# Patient Record
Sex: MALE | Race: OTHER | Employment: FULL TIME | URBAN - METROPOLITAN AREA
[De-identification: names, ages, dates, MRNs, and addresses within clinical notes are randomized per-mention and may not be internally consistent; named-entity substitution may affect disease eponyms.]

---

## 2019-04-23 ENCOUNTER — OFFICE VISIT (OUTPATIENT)
Dept: FAMILY MEDICINE CLINIC | Age: 44
End: 2019-04-23

## 2019-04-23 VITALS
DIASTOLIC BLOOD PRESSURE: 87 MMHG | TEMPERATURE: 98.5 F | HEIGHT: 70 IN | OXYGEN SATURATION: 100 % | HEART RATE: 54 BPM | WEIGHT: 230.2 LBS | SYSTOLIC BLOOD PRESSURE: 134 MMHG | BODY MASS INDEX: 32.96 KG/M2

## 2019-04-23 DIAGNOSIS — Z13.9 ENCOUNTER FOR SCREENING: ICD-10-CM

## 2019-04-23 DIAGNOSIS — G62.9 NEUROPATHY: Primary | ICD-10-CM

## 2019-04-23 LAB — GLUCOSE POC: NORMAL MG/DL

## 2019-04-23 RX ORDER — GABAPENTIN 300 MG/1
300 CAPSULE ORAL 3 TIMES DAILY
Qty: 90 CAP | Refills: 3 | Status: SHIPPED | OUTPATIENT
Start: 2019-04-23 | End: 2019-05-16
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
## 2019-04-23 NOTE — PROGRESS NOTES
Results for orders placed or performed in visit on 04/23/19   AMB POC GLUCOSE BLOOD, BY GLUCOSE MONITORING DEVICE   Result Value Ref Range    Glucose POC f 72 mg/dL

## 2019-04-23 NOTE — PROGRESS NOTES
Printed AVS, provided to pt and reviewed. Pt indicated understanding and had no questions. Told pt that rx's have been sent to pharmacy and they should be ready for  in approximately 2 hrs. Please present GoodRx. com coupon which we provide to your pharmacy to receive discounted price. The medication was reviewed with the pt. Mila Hale was the .   Dori Urias RN

## 2019-05-15 ENCOUNTER — TELEPHONE (OUTPATIENT)
Dept: FAMILY MEDICINE CLINIC | Age: 44
End: 2019-05-15

## 2019-05-15 DIAGNOSIS — M79.2 NEUROPATHIC PAIN: Primary | ICD-10-CM

## 2019-05-16 RX ORDER — AMITRIPTYLINE HYDROCHLORIDE 25 MG/1
25 TABLET, FILM COATED ORAL
Qty: 30 TAB | Refills: 2 | Status: SHIPPED | OUTPATIENT
Start: 2019-05-16 | End: 2019-07-29 | Stop reason: SINTOL

## 2019-05-16 NOTE — TELEPHONE ENCOUNTER
I talked with patient and he said he had a severe rash that itched on his hands and his feet. He stopped the gabapentin, took allergy medicine, and it has cleared. I said that allergic reactions usually are more widespread than only affecting the hands and feet; however, with his rash now gone, we can try another medication. I explained that the new medication is to be taken before bed once daily and he can pick it up at his [address] [place] [address] on [address]. I asked him to call back if any problems. No further questions at close of call.

## 2019-07-16 ENCOUNTER — TELEPHONE (OUTPATIENT)
Dept: FAMILY MEDICINE CLINIC | Age: 44
End: 2019-07-16

## 2019-07-16 NOTE — TELEPHONE ENCOUNTER
Patient  has been contacted 7/16/19 to notify change of Facility and reminders has been sent by mail.     Thank you

## 2019-07-25 ENCOUNTER — HOSPITAL ENCOUNTER (OUTPATIENT)
Dept: LAB | Age: 44
Discharge: HOME | End: 2019-07-25

## 2019-07-25 ENCOUNTER — OFFICE VISIT (OUTPATIENT)
Dept: FAMILY MEDICINE CLINIC | Age: 44
End: 2019-07-25

## 2019-07-25 VITALS
TEMPERATURE: 98.3 F | SYSTOLIC BLOOD PRESSURE: 142 MMHG | WEIGHT: 232 LBS | HEART RATE: 68 BPM | DIASTOLIC BLOOD PRESSURE: 80 MMHG | BODY MASS INDEX: 33.59 KG/M2 | OXYGEN SATURATION: 100 %

## 2019-07-25 DIAGNOSIS — G58.9 MONONEUROPATHY: ICD-10-CM

## 2019-07-25 DIAGNOSIS — G62.9 NEUROPATHY: Primary | ICD-10-CM

## 2019-07-25 PROCEDURE — 80061 LIPID PANEL: CPT

## 2019-07-25 PROCEDURE — 85025 COMPLETE CBC W/AUTO DIFF WBC: CPT

## 2019-07-25 PROCEDURE — 82607 VITAMIN B-12: CPT

## 2019-07-25 PROCEDURE — 80053 COMPREHEN METABOLIC PANEL: CPT

## 2019-07-25 NOTE — PROGRESS NOTES
Coordination of Care  1. Have you been to the ER, urgent care clinic since your last visit? Hospitalized since your last visit? No    2. Have you seen or consulted any other health care providers outside of the [address] [place] [address] since your last visit? Include any pap smears or colon screening. No    Does the patient need refills? NO    Learning Assessment Complete?  yes  Depression Screening complete in the past 12 months? yes
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
## 2019-07-25 NOTE — PROGRESS NOTES
AVS printed and reviewed. No escripts ordered today , reviewed 5/16/19 order for Amitriptyline and discussed refills. F.u per provider notes. Discussion assisted by [facility] , Jaye Longoria.

## 2019-07-26 LAB
ALBUMIN SERPL-MCNC: 4.6 G/DL (ref 3.5–5)
ALBUMIN/GLOB SERPL: 1.3 {RATIO} (ref 1.1–2.2)
ALP SERPL-CCNC: 99 U/L (ref 45–117)
ALT SERPL-CCNC: 84 U/L (ref 12–78)
ANION GAP SERPL CALC-SCNC: 5 MMOL/L (ref 5–15)
AST SERPL-CCNC: 30 U/L (ref 15–37)
BASOPHILS # BLD: 0.1 K/UL (ref 0–0.1)
BASOPHILS NFR BLD: 1 % (ref 0–1)
BILIRUB SERPL-MCNC: 0.5 MG/DL (ref 0.2–1)
BUN SERPL-MCNC: 12 MG/DL (ref 6–20)
BUN/CREAT SERPL: 11 (ref 12–20)
CALCIUM SERPL-MCNC: 10.2 MG/DL (ref 8.5–10.1)
CHLORIDE SERPL-SCNC: 105 MMOL/L (ref 97–108)
CHOLEST SERPL-MCNC: 297 MG/DL
CO2 SERPL-SCNC: 31 MMOL/L (ref 21–32)
CREAT SERPL-MCNC: 1.06 MG/DL (ref 0.7–1.3)
DIFFERENTIAL METHOD BLD: ABNORMAL
EOSINOPHIL # BLD: 0.1 K/UL (ref 0–0.4)
EOSINOPHIL NFR BLD: 3 % (ref 0–7)
ERYTHROCYTE [DISTWIDTH] IN BLOOD BY AUTOMATED COUNT: 13.5 % (ref 11.5–14.5)
GLOBULIN SER CALC-MCNC: 3.6 G/DL (ref 2–4)
GLUCOSE SERPL-MCNC: 94 MG/DL (ref 65–100)
HCT VFR BLD AUTO: 51.1 % (ref 36.6–50.3)
HDLC SERPL-MCNC: 37 MG/DL
HDLC SERPL: 8 {RATIO} (ref 0–5)
HGB BLD-MCNC: 16.3 G/DL (ref 12.1–17)
IMM GRANULOCYTES # BLD AUTO: 0 K/UL (ref 0–0.04)
IMM GRANULOCYTES NFR BLD AUTO: 1 % (ref 0–0.5)
LDLC SERPL CALC-MCNC: 211.2 MG/DL (ref 0–100)
LIPID PROFILE,FLP: ABNORMAL
LYMPHOCYTES # BLD: 1.8 K/UL (ref 0.8–3.5)
LYMPHOCYTES NFR BLD: 34 % (ref 12–49)
MCH RBC QN AUTO: 28.9 PG (ref 26–34)
MCHC RBC AUTO-ENTMCNC: 31.9 G/DL (ref 30–36.5)
MCV RBC AUTO: 90.6 FL (ref 80–99)
MONOCYTES # BLD: 0.3 K/UL (ref 0–1)
MONOCYTES NFR BLD: 6 % (ref 5–13)
NEUTS SEG # BLD: 2.9 K/UL (ref 1.8–8)
NEUTS SEG NFR BLD: 56 % (ref 32–75)
NRBC # BLD: 0 K/UL (ref 0–0.01)
NRBC BLD-RTO: 0 PER 100 WBC
PLATELET # BLD AUTO: 231 K/UL (ref 150–400)
POTASSIUM SERPL-SCNC: 5 MMOL/L (ref 3.5–5.1)
PROT SERPL-MCNC: 8.2 G/DL (ref 6.4–8.2)
RBC # BLD AUTO: 5.64 M/UL (ref 4.1–5.7)
SODIUM SERPL-SCNC: 141 MMOL/L (ref 136–145)
TRIGL SERPL-MCNC: 244 MG/DL (ref ?–150)
VIT B12 SERPL-MCNC: 638 PG/ML (ref 193–986)
VLDLC SERPL CALC-MCNC: 48.8 MG/DL
WBC # BLD AUTO: 5.2 K/UL (ref 4.1–11.1)

## 2019-07-29 DIAGNOSIS — G58.9 MONONEUROPATHY: Primary | ICD-10-CM

## 2019-07-29 DIAGNOSIS — E78.00 PURE HYPERCHOLESTEROLEMIA: Primary | ICD-10-CM

## 2019-07-29 RX ORDER — SIMVASTATIN 40 MG/1
40 TABLET, FILM COATED ORAL
Qty: 90 TAB | Refills: 1 | Status: SHIPPED | OUTPATIENT
Start: 2019-07-29 | End: 2019-11-12 | Stop reason: SDUPTHER

## 2019-07-29 RX ORDER — METHYLPREDNISOLONE 4 MG/1
TABLET ORAL
Qty: 21 TAB | Refills: 0 | Status: SHIPPED | OUTPATIENT
Start: 2019-07-29 | End: 2019-11-12

## 2019-07-29 NOTE — TELEPHONE ENCOUNTER
Patient to be informed that his cholesterol is extremely high (more than double the normal amount). In addition to dietary changes, I recommend he start medication for cholesterol which I am sending to his [address]. Keep follow up appointment. Return 2 weeks before that appointment, fasting, to re-check cholesterol. ALSO, needs to get an x-ray of his lumbar spine as a walk-in. Fill the prescription for the medrol dosepak for the leg pain.

## 2019-07-29 NOTE — PROGRESS NOTES
Sending in medication for very high cholesterol to his [facility]. Prior to scheduled follow up, return 2 weeks before for fasting labs to recheck cholesterol, as ordered. Diagnoses and all orders for this visit:    1. Pure hypercholesterolemia  -     simvastatin (ZOCOR) 40 mg tablet; Take 1 Tab by mouth nightly. For cholesterol. Nancie 1 tab en la noche para colesterol.  -     LIPID PANEL; Future    .

## 2019-07-31 NOTE — TELEPHONE ENCOUNTER
RN called pt with the assistance of , Sukh Guzman, and related all information in [facility] lab result message. RN reviewed the 2 new medications with pt. RN also reviewed dietary changes for reducing cholesterol and sent pt an information sheet from Clinical resources explaining diet recommendations. RN also recommended that pt exercise at least 30 minutes daily. Pt stated that he will go to either [facility] or [facility] for his lumbar spine xray. The care card application process was explained to pt. Pt was reminded of his appt on 11/12/19 and advised to return 2 weeks prior to that date for fasting labs. Pt expressed understanding of the above information. Sandy Nettles.

## 2019-10-13 ENCOUNTER — DOCUMENTATION ONLY (OUTPATIENT)
Dept: FAMILY MEDICINE CLINIC | Age: 44
End: 2019-10-13

## 2019-10-13 NOTE — PROGRESS NOTES
Has follow up with [facility] 11/12/19. Per Wild Clifford:  Nakia Velazquez, Wild Clifford PA sent to Sadia Quezada NP             A couple of thoughts-   1. Get imaging of lumbar spine looking for spondylosis, etc   2.  Try medrol dose pack - some sxs are presenting like sciatica and this might help him

## 2019-11-09 ENCOUNTER — APPOINTMENT (OUTPATIENT)
Dept: GENERAL RADIOLOGY | Age: 44
End: 2019-11-09
Attending: STUDENT IN AN ORGANIZED HEALTH CARE EDUCATION/TRAINING PROGRAM
Payer: SUBSIDIZED

## 2019-11-09 ENCOUNTER — HOSPITAL ENCOUNTER (EMERGENCY)
Age: 44
Discharge: HOME | End: 2019-11-09
Attending: STUDENT IN AN ORGANIZED HEALTH CARE EDUCATION/TRAINING PROGRAM | Admitting: STUDENT IN AN ORGANIZED HEALTH CARE EDUCATION/TRAINING PROGRAM
Payer: SUBSIDIZED

## 2019-11-09 VITALS
TEMPERATURE: 98.1 F | RESPIRATION RATE: 16 BRPM | DIASTOLIC BLOOD PRESSURE: 89 MMHG | OXYGEN SATURATION: 98 % | SYSTOLIC BLOOD PRESSURE: 140 MMHG | HEART RATE: 61 BPM

## 2019-11-09 DIAGNOSIS — S20.211A CONTUSION OF RIB ON RIGHT SIDE, INITIAL ENCOUNTER: Primary | ICD-10-CM

## 2019-11-09 PROCEDURE — 71101 X-RAY EXAM UNILAT RIBS/CHEST: CPT

## 2019-11-09 PROCEDURE — 99283 EMERGENCY DEPT VISIT LOW MDM: CPT

## 2019-11-09 RX ORDER — LIDOCAINE 50 MG/G
PATCH TOPICAL
Qty: 5 EACH | Refills: 0 | Status: SHIPPED | OUTPATIENT
Start: 2019-11-09

## 2019-11-09 NOTE — ED PROVIDER NOTES
Rib Pain   This is a new problem. The average episode lasts 17 hours. The problem occurs continuously. The current episode started yesterday (at [facility]). The problem has not changed since onset. Associated symptoms include chest pain (R ribs). Pertinent negatives include no fever, no neck pain, no cough, no syncope, no vomiting, no abdominal pain and no rash. Precipitated by: a fall, yesterday, while tying shoes, landed on floor. He has tried nothing for the symptoms. No past medical history on file. No past surgical history on file. No family history on file.     Social History     Socioeconomic History    Marital status: SINGLE     Spouse name: Not on file    Number of children: Not on file    Years of education: Not on file    Highest education level: Not on file   Occupational History    Not on file   Social Needs    Financial resource strain: Not on file    Food insecurity:     Worry: Not on file     Inability: Not on file    Transportation needs:     Medical: Not on file     Non-medical: Not on file   Tobacco Use    Smoking status: Never Smoker    Smokeless tobacco: Never Used   Substance and Sexual Activity    Alcohol use: Yes     Comment: OCC    Drug use: Never    Sexual activity: Not on file   Lifestyle    Physical activity:     Days per week: Not on file     Minutes per session: Not on file    Stress: Not on file   Relationships    Social connections:     Talks on phone: Not on file     Gets together: Not on file     Attends Scientologist service: Not on file     Active member of club or organization: Not on file     Attends meetings of clubs or organizations: Not on file     Relationship status: Not on file    Intimate partner violence:     Fear of current or ex partner: Not on file     Emotionally abused: Not on file     Physically abused: Not on file     Forced sexual activity: Not on file   Other Topics Concern    Not on file   Social History Narrative    Not on file ALLERGIES: Neurontin [gabapentin]    Review of Systems   Constitutional: Negative for fever. Respiratory: Negative for cough and shortness of breath. Cardiovascular: Positive for chest pain (R ribs). Negative for syncope. Gastrointestinal: Negative for abdominal pain and vomiting. Musculoskeletal: Negative for neck pain. Skin: Negative for rash. Vitals:    11/09/19 0924 11/09/19 0928   BP:  140/89   Pulse: 70 61   Resp:  16   Temp:  98.1 °F (36.7 °C)   SpO2: 100% 98%            Physical Exam   Constitutional: He is oriented to person, place, and time. He appears well-developed. No distress. HENT:   Head: Normocephalic and atraumatic. Eyes: Conjunctivae and EOM are normal.   Neck: Normal range of motion. Neck supple. Cardiovascular: Normal rate, regular rhythm and normal heart sounds. Pulmonary/Chest: Effort normal and breath sounds normal. No respiratory distress. He exhibits tenderness (over R lateral chest wall, no crepitus, no overlying ecchymosis). Abdominal: Soft. There is no tenderness. There is no guarding. Musculoskeletal: Normal range of motion. He exhibits no edema. Neurological: He is alert and oriented to person, place, and time. He exhibits normal muscle tone. Skin: Skin is warm and dry. Vitals reviewed. MDM       Procedures    The patient presented with a complaint of a fall or minor trauma. The patient is now resting comfortably and feels better, is alert and in no distress. The patient has a normal mental status and is neurologically intact. The history, exam, diagnostic testing (if any) and current condition do not demonstrate signs of clinically significant intra-cranial, intra-thoracic, intra-abdominal, or musculoskeletal trauma. The vital signs have been stable. The patient's condition is stable and appropriate for discharge.  The patient will pursue further outpatient evaluation with the primary care physician or other designated or consulting physician as indicated in the discharge instructions.

## 2019-11-09 NOTE — DISCHARGE INSTRUCTIONS
Patient Education        Contusión de starr: Instrucciones de cuidado - [ Bruised Rib: Care Instructions ]  Generalidades    Usted puede magullarse blanco starr si se  o se golpea, naheed en un accidente o al practicar deportes. El término médico para blanco magulladura es \"contusión\". Pequeños vasos sanguíneos se desgarran y evsta escapar jose miguel bajo la piel. Verlon Pawel de las personas se imaginan blanco magulladura naheed blanco jarred janelle y yvette (moretón). Raya los [employer] y los músculos también pueden [place]. Blanco lesión puede dañar la starr, raya no causar un moretón visible. A veces puede ser difícil saber si blanco starr está magullada o fracturada. Los síntomas pueden ser los mismos. Y un hueso fracturado no siempre se puede teressa en blanco radiografía. Raya el tratamiento para blanco contusión de starr suele ser el mismo que para blanco fractura de [place]. Loann [place] a las costillas puede causar dolor. El dolor puede ser peor cuando respira hondo, tose o estornuda. En la mayoría de [facility], blanco starr magullada norma por sí myriam. Puede mora analgésicos (medicamentos para el dolor) hasta que la starr sane. El alivio del dolor le permite respirar hondo. La atención de seguimiento es blanco parte clave de trejo tratamiento y seguridad. Asegúrese de hacer y acudir a todas las citas, y llame a trejo médico si está teniendo problemas. También es blanco buena idea saber los resultados de jade exámenes y mantener blanco lista de los medicamentos que matt. ¿Cómo puede cuidarse en el hogar? · Descanse y proteja la jarred lesionada o dolorida. Suspenda, cambie o descanse de cualquier actividad que le cause dolor. · Aplíquese hielo o blanco compresa fría en la jarred por entre 10 y 21 minutos cada vez. Póngase un paño del rio entre el hielo y la piel. · Después de 2 o 3 días, si la hinchazón ha desaparecido, aplíquese blanco almohadilla térmica (a baja temperatura) o un paño tibio sobre el pecho.  Algunos médicos sugieren que alterne entre calor y frío. Póngase un paño del rio entre la almohadilla térmica y la piel. · Pregúntele a trejo médico si puede mora un analgésico de venta angelica, naheed acetaminofén (Tylenol), ibuprofeno (Advil, Motrin) o naproxeno (Aleve). Sea sue con los medicamentos. Suzanne y siga todas las instrucciones de la [place]. · A medida que el dolor disminuye, regrese lentamente a jade actividades normales. Sea paciente. Las contusiones de starr pueden tardar semanas o meses en sanar. Si el dolor MARY, podría ser blanco señal de que necesita descansar fariba [place]. ¿Cuándo debe pedir ayuda? Llame al 911 en cualquier momento que considere que necesita atención de [place]. Por ejemplo, llame si:    · Tiene mucha dificultad para respirar.     Llame a trejo médico ahora mismo o busque atención médica inmediata si:    · Tiene dificultad para respirar.     · Tiene fiebre.     · Tiene tos nueva o peor.     · Tiene dolor nuevo o peor.    Preste especial atención a los cambios en trejo therese y asegúrese de comunicarse con trejo médico si:    · No mejora naheed se esperaba. ¿Dónde puede encontrar más información en inglés? Minerva Biggs a http://stephanie-marcia.info/. Escriba R125 en la búsqueda para aprender más acerca de \"Contusión de starr: Instrucciones de cuidado - [ Bruised Rib: Care Instructions ]. \"  Revisado: 26 junio, 2019  Versión del contenido: 12.2  © 5633-7043 [employer]. Las instrucciones de cuidado fueron adaptadas bajo licencia por [employer] (which disclaims liability or warranty for this information). Si usted tiene [place] afección médica o sobre estas instrucciones, siempre pregunte a trejo profesional de therese. [facility] niega toda garantía o responsabilidad por trejo uso de esta información.
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
## 2019-11-12 ENCOUNTER — HOSPITAL ENCOUNTER (OUTPATIENT)
Dept: LAB | Age: 44
Discharge: HOME | End: 2019-11-12

## 2019-11-12 ENCOUNTER — OFFICE VISIT (OUTPATIENT)
Dept: FAMILY MEDICINE CLINIC | Age: 44
End: 2019-11-12

## 2019-11-12 VITALS
DIASTOLIC BLOOD PRESSURE: 80 MMHG | BODY MASS INDEX: 32.35 KG/M2 | WEIGHT: 226 LBS | OXYGEN SATURATION: 98 % | HEART RATE: 57 BPM | SYSTOLIC BLOOD PRESSURE: 131 MMHG | HEIGHT: 70 IN | TEMPERATURE: 98.2 F

## 2019-11-12 DIAGNOSIS — M72.2 PLANTAR FASCIITIS OF LEFT FOOT: Primary | ICD-10-CM

## 2019-11-12 DIAGNOSIS — Z23 ENCOUNTER FOR IMMUNIZATION: ICD-10-CM

## 2019-11-12 DIAGNOSIS — S20.211D CONTUSION OF RIB ON RIGHT SIDE, SUBSEQUENT ENCOUNTER: ICD-10-CM

## 2019-11-12 DIAGNOSIS — E78.2 MIXED HYPERLIPIDEMIA: ICD-10-CM

## 2019-11-12 LAB
ALBUMIN SERPL-MCNC: 4.3 G/DL (ref 3.5–5)
ALBUMIN/GLOB SERPL: 1.2 {RATIO} (ref 1.1–2.2)
ALP SERPL-CCNC: 95 U/L (ref 45–117)
ALT SERPL-CCNC: 69 U/L (ref 12–78)
ANION GAP SERPL CALC-SCNC: 3 MMOL/L (ref 5–15)
AST SERPL-CCNC: 33 U/L (ref 15–37)
BILIRUB SERPL-MCNC: 0.5 MG/DL (ref 0.2–1)
BUN SERPL-MCNC: 14 MG/DL (ref 6–20)
BUN/CREAT SERPL: 16 (ref 12–20)
CALCIUM SERPL-MCNC: 9.7 MG/DL (ref 8.5–10.1)
CHLORIDE SERPL-SCNC: 108 MMOL/L (ref 97–108)
CHOLEST SERPL-MCNC: 246 MG/DL
CO2 SERPL-SCNC: 30 MMOL/L (ref 21–32)
CREAT SERPL-MCNC: 0.88 MG/DL (ref 0.7–1.3)
GLOBULIN SER CALC-MCNC: 3.7 G/DL (ref 2–4)
GLUCOSE SERPL-MCNC: 88 MG/DL (ref 65–100)
HDLC SERPL-MCNC: 43 MG/DL
HDLC SERPL: 5.7 {RATIO} (ref 0–5)
LDLC SERPL CALC-MCNC: 183.2 MG/DL (ref 0–100)
LIPID PROFILE,FLP: ABNORMAL
POTASSIUM SERPL-SCNC: 5.3 MMOL/L (ref 3.5–5.1)
PROT SERPL-MCNC: 8 G/DL (ref 6.4–8.2)
SODIUM SERPL-SCNC: 141 MMOL/L (ref 136–145)
TRIGL SERPL-MCNC: 99 MG/DL (ref ?–150)
VLDLC SERPL CALC-MCNC: 19.8 MG/DL

## 2019-11-12 PROCEDURE — 80053 COMPREHEN METABOLIC PANEL: CPT

## 2019-11-12 PROCEDURE — 80061 LIPID PANEL: CPT

## 2019-11-12 RX ORDER — SIMVASTATIN 40 MG/1
40 TABLET, FILM COATED ORAL
Qty: 90 TAB | Refills: 1 | Status: SHIPPED | OUTPATIENT
Start: 2019-11-12 | End: 2019-11-14 | Stop reason: SDUPTHER

## 2019-11-12 RX ORDER — AMITRIPTYLINE HYDROCHLORIDE 10 MG/1
10 TABLET, FILM COATED ORAL
Qty: 30 TAB | Refills: 0 | Status: SHIPPED | OUTPATIENT
Start: 2019-11-12

## 2019-11-12 NOTE — PROGRESS NOTES
[facility]  Requests flu vaccine. Denies fever and egg allergy. VIS information sheet given to patient. Explained possible s/e. Reviewed s/sx indicating need to be seen in ER. Patient had no adverse reaction at time of discharge. Entered into VIIS. GIVEN BY EDUAR SHIN LPN.  Alana Stovall RN

## 2019-11-12 NOTE — PROGRESS NOTES
Check-out Note: Labs today.  I sent in simvastatin (cholesterol med) for #90 and a refill.  If labs show you need a change to this treatment we will call.  I sent in 1 month of amitriptyline 10 mg.  Take qhs.  I am sorry I don't have 1 month follow up appointments, you would need to make the line.  If this works please have him call us and as long as all labs are normal and medicine working well I will send in more. Sunil Shrestha for flu vaccine today. Printed AVS, provided to pt and reviewed. Pt indicated understanding and had no questions. Told pt that rx's have been sent to pharmacy and they should be ready for  in approximately 2 hrs. Reviewed all medication's with the pt. Pt recommended to make the line in 1 month if medication's not working well or if he needs to be seen sooner. No appts are available at this time in 1 month. Pt told if labs are ok and medications are working well he should call to the [facility] office and let the nurse know and the provider can refill the rx. Int # [id number]. Pt requesting flu vaccine. Pt referred to the [facility] in the registration area to get his flu vaccine.  Jenny Freire RN

## 2019-11-12 NOTE — PROGRESS NOTES
Coordination of Care  1. Have you been to the ER, urgent care clinic since your last visit? Hospitalized since your last visit? No    2. Have you seen or consulted any other health care providers outside of the [address] [place] [address] since your last visit? Include any pap smears or colon screening. No    Does the patient need refills no    Learning Assessment Complete?  yes  Depression Screening complete in the past 12 months? yes

## 2019-11-14 DIAGNOSIS — E78.2 MIXED HYPERLIPIDEMIA: ICD-10-CM

## 2019-11-14 DIAGNOSIS — E87.5 HYPERKALEMIA: Primary | ICD-10-CM

## 2019-11-14 RX ORDER — SIMVASTATIN 40 MG/1
80 TABLET, FILM COATED ORAL
Qty: 90 TAB | Refills: 0
Start: 2019-11-14 | End: 2019-12-29

## 2019-11-14 RX ORDER — SIMVASTATIN 80 MG/1
80 TABLET, FILM COATED ORAL
Qty: 90 TAB | Refills: 1 | Status: SHIPPED | OUTPATIENT
Start: 2019-12-30

## 2019-11-14 RX ORDER — SIMVASTATIN 80 MG/1
80 TABLET, FILM COATED ORAL
Qty: 90 TAB | Refills: 1 | Status: SHIPPED | OUTPATIENT
Start: 2019-12-30 | End: 2019-11-14 | Stop reason: SDUPTHER

## 2019-11-14 NOTE — PROGRESS NOTES
Patient's cholesterol is still significantly high on simvastatin 40 mg po qhs.  - increase simvastatin 40 mg, 1 po qhs to 2 po qhs  - when these run out, purchase simvastatin 80 mg, 1 po qhs  - check fasting lipid panel around 2/14/20. Patient's potassium is elevated. - Avoid high potassium foods and salt substitutes. - Recheck CMP (nonfasting) around 12/27/19. Diagnoses and all orders for this visit:    1. Mixed hyperlipidemia  -     simvastatin (ZOCOR) 40 mg tablet; Take 2 Tabs by mouth nightly for 45 days. For cholesterol. Nancie 2 tabletas en la noche para colesterol.  -     simvastatin (ZOCOR) 80 mg tablet; Take 1 Tab by mouth nightly. For cholesterol. Nancie 1 tab antes de dormir para colesterol.  -     LIPID PANEL; Future (2/14/20, about 3 months)    2. Hyperkalemia  -     METABOLIC PANEL, COMPREHENSIVE; Future (12/27/19, about 6 weeks)          Current Outpatient Medications   Medication Sig    simvastatin (ZOCOR) 40 mg tablet Take 2 Tabs by mouth nightly for 45 days until 12/29/19. For cholesterol.  [START ON 12/30/2019] simvastatin (ZOCOR) 80 mg tablet Take 1 Tab by mouth nightly. For cholesterol.

## 2019-11-15 ENCOUNTER — TELEPHONE (OUTPATIENT)
Dept: FAMILY MEDICINE CLINIC | Age: 44
End: 2019-11-15

## 2019-11-15 NOTE — TELEPHONE ENCOUNTER
The pt was called with the assistance of Kasandra Pinon as . The pt was given the entire provider's lab results and recommendation's message. The pt was told about the Cholesterol medication Simvastatin 40 mg to increase it to 2 tablets daily. He was instructed he would run out on 12/29/19. New rx was sent to the Pharmacy to start 12/30/19, for this medication, but each tablet in the new rx would be 80 mg so he would only take 1 pill daily of the new Simvastatin rx. The was told his K+ was slightly elevated and he was told to avoid high K+ foods and supplements that contain K+. The pt was given examples of high K+ foods. Pt asked how high was his Cholesterol. He was told 246. The pt was told normal would be <200. The pt was given a lab only appt for 02/10/2020. The pt verbalized understanding.  Jeremiah Hanna RN

## 2019-11-15 NOTE — TELEPHONE ENCOUNTER
----- Message from Vinny Junior NP sent at 11/14/2019  6:29 PM EST -----  Regarding: New dose of simvastatin, return for labs  Hi nurses,  This patient needs the following information:    I. His cholesterol is still quite high on 40 mg simvastatin. Increase to 2 pills of the 40 mg statin which will last until around 12/29/19. Purchase new rx I sent in for 80 mg simvastatin starting 12/30/19. II. His potassium was slightly up. Avoid high potassium foods and supplements that may contain potassium. Follow up for nonfasting lab (as ordered in his chart) around 12/27/19. III. Return around 2/14/20 (about 3 months from now) for FASTING lipid panel. Many thanks!   Christopher Bui

## 2019-11-15 NOTE — TELEPHONE ENCOUNTER
----- Message from Fariha Murillo NP sent at 11/14/2019  6:29 PM EST -----  Regarding: New dose of simvastatin, return for labs  Hi nurses,  This patient needs the following information:    I. His cholesterol is still quite high on 40 mg simvastatin. Increase to 2 pills of the 40 mg statin which will last until around 12/29/19. Purchase new rx I sent in for 80 mg simvastatin starting 12/30/19. II. His potassium was slightly up. Avoid high potassium foods and supplements that may contain potassium. Follow up for nonfasting lab (as ordered in his chart) around 12/27/19. III. Return around 2/14/20 (about 3 months from now) for FASTING lipid panel. Many thanks!   Edita Azul

## 2020-04-08 NOTE — TELEPHONE ENCOUNTER
Received a reply from the provider that the rx for Gabapentin will not be refilled due to the medication is on the pt's list as a medicine he is allergic to. The pt's Pharmacy was called and told the provider is denying the refill of the prescription due to it is on his allergy list. The pharmacy stated that the pt called and requested the medication be refilled. The pt was called using clarice Ponce CMA. The pt was given the providers message that the provider will not be refilling this medication due to he had side effects from it when it was prescribed it before, per the providers last note, and it was listed as a medicine he is allergic to. The pt stated he still had some of the medicine left and he had been taking it off and on, not everyday, and not having any problems with it. So he decided the medicine was ok for him to take. The pt was told the provider would be sent a message regarding this, but he should call the appt line and have a follow up appt with the provider as soon as possible. The pt's last f/u appt was 11/19/19. The pt was given the same day appt line # and protocol was reviewed with him. He verbalized understanding. The pt stated he was calling the appt line tomorrow morning. The pt was a no show at his 02/10/20, lab only appt.  Levi Restrepo RN

## 2020-04-08 NOTE — TELEPHONE ENCOUNTER
We will evaluate neurontin at a follow up appointment. I wanted to check if he has been taking the simvastatin 80 mg daily and has bought the refill? Has he been taking it daily for the last 3 months? If yes, I want to schedule a labs only appointment to recheck his cholesterol and I will reorder labs from the appointment that he missed. If not, we can further address both issues when he follows up.   Acosta Heaton NP

## 2020-04-08 NOTE — TELEPHONE ENCOUNTER
I have this listed as an allergy with reaction of rash. I will not be able to prescribe this medication for this patient.   Isadora Beltran NP

## 2020-04-08 NOTE — TELEPHONE ENCOUNTER
Received a refill request from the pt's Pharmacy for refill of gabapentin 300 mg, pt last rx was written 04/23/19. The pt had #90, 3 refills at that time. The pt's last office visit was 11/12/19. Refill request routed to the provider.  Arash Gallagher RN

## 2020-04-13 ENCOUNTER — TELEPHONE (OUTPATIENT)
Dept: FAMILY MEDICINE CLINIC | Age: 45
End: 2020-04-13

## 2020-04-13 NOTE — TELEPHONE ENCOUNTER
Tc to the pt 2x. The second time the pt answered. The pt was asked if he had been taking his cholesterol medication for the last 3 month's. The pt stated that he had. He takes Simvastatin every night. The pt was told that the provider would like him to have fasting lab work. The pt was asked if he had tried calling the [facility] same day appt line #. The pt stated that no he had not. He had lost the phone #. The pt was given the [facility] same day appt line # and the Covid hotline #. The pt was told a registrar would be calling him back to schedule his fasting lab only appt.  Eliane Gallardo RN

## 2020-04-13 NOTE — TELEPHONE ENCOUNTER
Call the pt and check and see if he has been taking his cholesterol medication the last 3 month's. If he has then he may be scheduled for a fasting labs only appt per [facility].  Alejandro Godinez RN

## 2023-05-26 RX ORDER — LIDOCAINE 50 MG/G
PATCH TOPICAL
COMMUNITY
Start: 2019-11-09

## 2023-05-26 RX ORDER — SIMVASTATIN 80 MG
80 TABLET ORAL
COMMUNITY
Start: 2019-12-30

## 2023-05-26 RX ORDER — AMITRIPTYLINE HYDROCHLORIDE 10 MG/1
10 TABLET, FILM COATED ORAL
COMMUNITY
Start: 2019-11-12